# Patient Record
Sex: FEMALE | Race: WHITE | Employment: FULL TIME | URBAN - METROPOLITAN AREA
[De-identification: names, ages, dates, MRNs, and addresses within clinical notes are randomized per-mention and may not be internally consistent; named-entity substitution may affect disease eponyms.]

---

## 2019-06-05 PROBLEM — L50.9 HIVES: Status: ACTIVE | Noted: 2019-06-05

## 2019-06-05 PROBLEM — F43.23 ADJUSTMENT DISORDER WITH MIXED ANXIETY AND DEPRESSED MOOD: Status: ACTIVE | Noted: 2019-06-05

## 2020-02-19 ENCOUNTER — HOSPITAL ENCOUNTER (OUTPATIENT)
Dept: MAMMOGRAPHY | Age: 45
Discharge: HOME | End: 2020-02-19

## 2020-02-19 DIAGNOSIS — Z00.00 WELLNESS EXAMINATION: ICD-10-CM

## 2020-02-19 DIAGNOSIS — Z12.31 SCREENING MAMMOGRAM, ENCOUNTER FOR: ICD-10-CM

## 2020-02-26 ENCOUNTER — HOSPITAL ENCOUNTER (OUTPATIENT)
Dept: MAMMOGRAPHY | Age: 45
Discharge: HOME | End: 2020-02-26
Attending: INTERNAL MEDICINE
Payer: COMMERCIAL

## 2020-02-26 DIAGNOSIS — R92.8 ABNORMAL MAMMOGRAM: ICD-10-CM

## 2020-02-26 PROCEDURE — 77065 DX MAMMO INCL CAD UNI: CPT

## 2022-02-22 ENCOUNTER — HOSPITAL ENCOUNTER (OUTPATIENT)
Dept: MAMMOGRAPHY | Age: 47
Discharge: HOME | End: 2022-02-22
Attending: INTERNAL MEDICINE
Payer: COMMERCIAL

## 2022-02-22 DIAGNOSIS — Z12.31 SCREENING MAMMOGRAM, ENCOUNTER FOR: ICD-10-CM

## 2022-02-22 PROCEDURE — 77063 BREAST TOMOSYNTHESIS BI: CPT

## 2022-03-18 PROBLEM — L50.9 HIVES: Status: ACTIVE | Noted: 2019-06-05

## 2022-03-19 PROBLEM — F43.23 ADJUSTMENT DISORDER WITH MIXED ANXIETY AND DEPRESSED MOOD: Status: ACTIVE | Noted: 2019-06-05

## 2022-12-13 ENCOUNTER — OFFICE VISIT (OUTPATIENT)
Dept: INTERNAL MEDICINE CLINIC | Facility: CLINIC | Age: 47
End: 2022-12-13
Payer: COMMERCIAL

## 2022-12-13 VITALS
HEIGHT: 70 IN | BODY MASS INDEX: 21.82 KG/M2 | WEIGHT: 152.4 LBS | DIASTOLIC BLOOD PRESSURE: 60 MMHG | SYSTOLIC BLOOD PRESSURE: 100 MMHG

## 2022-12-13 DIAGNOSIS — B00.9 HERPES SIMPLEX VIRUS INFECTION: ICD-10-CM

## 2022-12-13 DIAGNOSIS — Z00.00 WELLNESS EXAMINATION: ICD-10-CM

## 2022-12-13 DIAGNOSIS — R10.13 MIDEPIGASTRIC PAIN: Primary | ICD-10-CM

## 2022-12-13 PROCEDURE — 99213 OFFICE O/P EST LOW 20 MIN: CPT | Performed by: INTERNAL MEDICINE

## 2022-12-13 RX ORDER — FAMCICLOVIR 500 MG/1
500 TABLET, FILM COATED ORAL 3 TIMES DAILY
Qty: 45 TABLET | Refills: 1 | Status: SHIPPED | OUTPATIENT
Start: 2022-12-13 | End: 2022-12-28

## 2022-12-13 ASSESSMENT — ENCOUNTER SYMPTOMS
NAUSEA: 0
ABDOMINAL DISTENTION: 0
ABDOMINAL PAIN: 0
DIARRHEA: 0

## 2022-12-13 ASSESSMENT — PATIENT HEALTH QUESTIONNAIRE - PHQ9
2. FEELING DOWN, DEPRESSED OR HOPELESS: 0
SUM OF ALL RESPONSES TO PHQ QUESTIONS 1-9: 0
SUM OF ALL RESPONSES TO PHQ9 QUESTIONS 1 & 2: 0
SUM OF ALL RESPONSES TO PHQ QUESTIONS 1-9: 0
SUM OF ALL RESPONSES TO PHQ QUESTIONS 1-9: 0
1. LITTLE INTEREST OR PLEASURE IN DOING THINGS: 0
SUM OF ALL RESPONSES TO PHQ QUESTIONS 1-9: 0

## 2022-12-13 NOTE — PROGRESS NOTES
HPI: Dina Celis (: 1975)    Has an area on her right mid thigh that has broken out in a rash that is raised and with pustules- has happened off an on for several yrs  Initially gets tingling and burning sensation and then it breaks out  Also feels similar symptoms under her right breast and along her ribs and was concerned about shingles    She has been also having some midepigastric pain recently    Problem List:  Patient Active Problem List   Diagnosis    Acquired hypothyroidism    Hives    Colicky RUQ abdominal pain    Intermittent diarrhea    Adult acne    Adjustment disorder with mixed anxiety and depressed mood    Insomnia, persistent       History:  Past Medical History:   Diagnosis Date    Acquired hypothyroidism 2016    Adjustment disorder     Adult acne 6402    Colicky RUQ abdominal pain 2016    Insomnia, persistent 2016    Irregular menses        Allergies: Allergies   Allergen Reactions    Nitrofurantoin Other (See Comments)     Caused headache, dizziness, drowsiness       Current Medications:  Current Outpatient Medications   Medication Sig Dispense Refill    famciclovir (FAMVIR) 500 MG tablet Take 1 tablet by mouth 3 times daily for 15 days 45 tablet 1    citalopram (CELEXA) 10 MG tablet Take 10 mg by mouth daily      levothyroxine (SYNTHROID) 25 MCG tablet Take 25 mcg by mouth every morning (before breakfast)      spironolactone (ALDACTONE) 25 MG tablet Take 25 mg by mouth 2 times daily      zolpidem (AMBIEN) 10 MG tablet Take 10 mg by mouth. No current facility-administered medications for this visit. Review of Systems:  Review of Systems   Gastrointestinal:  Negative for abdominal distention, abdominal pain, diarrhea and nausea. Midepigastric pain   Skin:  Positive for rash. All other systems reviewed and are negative.     Vitals:  /60   Ht 5' 10\" (1.778 m)   Wt 152 lb 6.4 oz (69.1 kg)   BMI 21.87 kg/m²     Physical Exam:  Physical Exam  Vitals reviewed. Constitutional:       Appearance: Normal appearance. HENT:      Head: Normocephalic and atraumatic. Cardiovascular:      Rate and Rhythm: Normal rate and regular rhythm. Heart sounds: Normal heart sounds. Pulmonary:      Effort: Pulmonary effort is normal.      Breath sounds: Normal breath sounds. Abdominal:      General: Abdomen is flat. Bowel sounds are normal.      Palpations: Abdomen is soft. Tenderness: There is abdominal tenderness. Musculoskeletal:         General: Normal range of motion. Cervical back: Normal range of motion and neck supple. Skin:     General: Skin is warm and dry. Findings: Rash present. Comments: Erythematous area quarter size with early pustules- mid right thigh  No rash noted at RUQ under right breast   Neurological:      General: No focal deficit present. Mental Status: She is alert and oriented to person, place, and time. Psychiatric:         Mood and Affect: Mood normal.         Behavior: Behavior normal.         Thought Content: Thought content normal.         Judgment: Judgment normal.        Assessment/Plan:   Carmelita Miranda was seen today for follow-up. Diagnoses and all orders for this visit:    Midepigastric pain  Could have reflux or gastritis- try OTC Pepcid prn  Herpes simplex virus infection  Recommend taking Famvir TID for 7-10 days and RX to use if it reoccurs  Recommend Shingrix at age 47 as she had Chicken pox as a child  Other orders  -     CBC with Auto Differential; Future  -     Comprehensive Metabolic Panel; Future  -     TSH; Future  -     Lipid Panel; Future  -     Vitamin D 25 Hydroxy; Future  -     famciclovir (FAMVIR) 500 MG tablet; Take 1 tablet by mouth 3 times daily for 15 days    Due for her Wellness so will go ahead and get her Wellness labs      Current medications are therapeutic at this time; continue as prescribed.         Zeynep Mccann MD

## 2023-01-09 RX ORDER — CITALOPRAM 10 MG/1
TABLET ORAL
Qty: 90 TABLET | Refills: 2 | Status: SHIPPED | OUTPATIENT
Start: 2023-01-09